# Patient Record
Sex: FEMALE | Race: WHITE
[De-identification: names, ages, dates, MRNs, and addresses within clinical notes are randomized per-mention and may not be internally consistent; named-entity substitution may affect disease eponyms.]

---

## 2023-05-09 ENCOUNTER — HOSPITAL ENCOUNTER (OUTPATIENT)
Dept: HOSPITAL 75 - PREOP | Age: 68
Discharge: HOME | End: 2023-05-09
Attending: OTOLARYNGOLOGY
Payer: MEDICARE

## 2023-05-09 VITALS — HEIGHT: 63.98 IN | BODY MASS INDEX: 27.36 KG/M2 | WEIGHT: 160.28 LBS

## 2023-05-09 DIAGNOSIS — Z01.818: Primary | ICD-10-CM

## 2023-05-12 ENCOUNTER — HOSPITAL ENCOUNTER (OUTPATIENT)
Dept: HOSPITAL 75 - SDC | Age: 68
End: 2023-05-12
Attending: OTOLARYNGOLOGY
Payer: MEDICARE

## 2023-05-12 VITALS — SYSTOLIC BLOOD PRESSURE: 112 MMHG | DIASTOLIC BLOOD PRESSURE: 78 MMHG

## 2023-05-12 VITALS — SYSTOLIC BLOOD PRESSURE: 113 MMHG | DIASTOLIC BLOOD PRESSURE: 72 MMHG

## 2023-05-12 VITALS — BODY MASS INDEX: 27.36 KG/M2 | HEIGHT: 63.98 IN | WEIGHT: 160.28 LBS

## 2023-05-12 VITALS — SYSTOLIC BLOOD PRESSURE: 136 MMHG | DIASTOLIC BLOOD PRESSURE: 82 MMHG

## 2023-05-12 VITALS — DIASTOLIC BLOOD PRESSURE: 88 MMHG | SYSTOLIC BLOOD PRESSURE: 134 MMHG

## 2023-05-12 VITALS — DIASTOLIC BLOOD PRESSURE: 66 MMHG | SYSTOLIC BLOOD PRESSURE: 100 MMHG

## 2023-05-12 VITALS — SYSTOLIC BLOOD PRESSURE: 97 MMHG | DIASTOLIC BLOOD PRESSURE: 60 MMHG

## 2023-05-12 VITALS — SYSTOLIC BLOOD PRESSURE: 134 MMHG | DIASTOLIC BLOOD PRESSURE: 88 MMHG

## 2023-05-12 VITALS — SYSTOLIC BLOOD PRESSURE: 110 MMHG | DIASTOLIC BLOOD PRESSURE: 68 MMHG

## 2023-05-12 VITALS — DIASTOLIC BLOOD PRESSURE: 63 MMHG | SYSTOLIC BLOOD PRESSURE: 101 MMHG

## 2023-05-12 VITALS — DIASTOLIC BLOOD PRESSURE: 66 MMHG | SYSTOLIC BLOOD PRESSURE: 109 MMHG

## 2023-05-12 DIAGNOSIS — H69.90: ICD-10-CM

## 2023-05-12 DIAGNOSIS — H93.8X3: ICD-10-CM

## 2023-05-12 DIAGNOSIS — F17.210: ICD-10-CM

## 2023-05-12 DIAGNOSIS — H65.23: Primary | ICD-10-CM

## 2023-05-12 DIAGNOSIS — Z28.310: ICD-10-CM

## 2023-05-12 PROCEDURE — 87081 CULTURE SCREEN ONLY: CPT

## 2023-05-12 PROCEDURE — 82947 ASSAY GLUCOSE BLOOD QUANT: CPT

## 2023-05-12 PROCEDURE — 93005 ELECTROCARDIOGRAM TRACING: CPT

## 2023-05-12 NOTE — ANESTHESIA-GENERAL POST-OP
General


Patient Condition


Mental Status/LOC:  Same as Preop


Cardiovascular:  Satisfactory


Nausea/Vomiting:  Absent


Respiratory:  Satisfactory


Pain:  Controlled


Complications:  Absent





Post Op Complications


Complications


None





Follow Up Care/Instructions


Patient Instructions


None needed.





Anesthesia/Patient Condition


Patient Condition


Patient is doing well, no complaints, stable vital signs, no apparent adverse 

anesthesia problems.   


No complications reported per nursing.


[place] [facility] [place]











TAM PENN CRNA           May 12, 2023 09:47

## 2023-05-12 NOTE — PROGRESS NOTE-POST OPERATIVE
Post-Operative Progess Note


Surgeon (s)/Assistant (s)


Surgeon


LINDSAY PALMER MD


Assistant


n/a





Pre-Operative Diagnosis


Bilat EDER





Post-Operative Diagnosis


same





Post-Op Procedure Note


Date of Procedure:  May 12, 2023


Name of Procedure Performed:  


BMT


Description & Findings


Description and Findings:





n/a


Anesthesia Type


lma


Estimated Blood Loss


minimal


Packing


none.


Specimen(s) collected/removed


none











LINDSAY PALMER MD             May 12, 2023 07:49

## 2023-05-12 NOTE — PROGRESS NOTE-PRE OPERATIVE
Pre-Operative Progress Note


Date of Available H&P:  May 12, 2023


Date H&P Reviewed:  May 12, 2023


Time H&P Reviewed:  07:00


History & Physical:  H&P Reviewed, Patient Examed, No changes noted


Changes from last HP


none


Pre-Operative Diagnosis:  LINDSAY Fang MD             May 12, 2023 07:49